# Patient Record
Sex: FEMALE | Race: BLACK OR AFRICAN AMERICAN | Employment: UNEMPLOYED | URBAN - METROPOLITAN AREA
[De-identification: names, ages, dates, MRNs, and addresses within clinical notes are randomized per-mention and may not be internally consistent; named-entity substitution may affect disease eponyms.]

---

## 2024-11-08 ENCOUNTER — OFFICE VISIT (OUTPATIENT)
Age: 49
End: 2024-11-08

## 2024-11-08 VITALS
OXYGEN SATURATION: 96 % | TEMPERATURE: 98.1 F | HEART RATE: 101 BPM | BODY MASS INDEX: 28.28 KG/M2 | SYSTOLIC BLOOD PRESSURE: 112 MMHG | DIASTOLIC BLOOD PRESSURE: 80 MMHG | HEIGHT: 61 IN | WEIGHT: 149.8 LBS

## 2024-11-08 DIAGNOSIS — Z02.2 ENCOUNTER FOR EXAMINATION FOR ADMISSION TO RESIDENTIAL INSTITUTION: Primary | ICD-10-CM

## 2024-11-08 RX ORDER — ESZOPICLONE 2 MG/1
2 TABLET, FILM COATED ORAL NIGHTLY
COMMUNITY

## 2024-11-08 RX ORDER — QUETIAPINE FUMARATE 200 MG/1
200 TABLET, FILM COATED ORAL 2 TIMES DAILY
COMMUNITY

## 2024-11-08 RX ORDER — DIVALPROEX SODIUM 500 MG/1
500 TABLET, DELAYED RELEASE ORAL 3 TIMES DAILY
COMMUNITY

## 2024-11-08 RX ORDER — QUETIAPINE FUMARATE 400 MG/1
400 TABLET, FILM COATED ORAL 2 TIMES DAILY
COMMUNITY

## 2024-11-08 NOTE — PROGRESS NOTES
Mulugeta Knowles (:  1975) is a 49 y.o. female,New patient, here for evaluation of the following chief complaint(s):  Annual Exam         Assessment & Plan  Encounter for examination for admission to residential institution   Clear for admission to facility           No follow-ups on file.       Subjective   Pt here for physical exam for sober-living facility  Pt has hx of alcohol abuse and is trying to quit  Does smoke cigarettes which she states she is trying to quit      History provided by:  Patient      Review of Systems   All other systems reviewed and are negative.         Objective   Physical Exam  Vitals reviewed.   Constitutional:       Appearance: Normal appearance.   HENT:      Head: Normocephalic and atraumatic.   Eyes:      Extraocular Movements: Extraocular movements intact.      Pupils: Pupils are equal, round, and reactive to light.   Cardiovascular:      Rate and Rhythm: Normal rate and regular rhythm.      Heart sounds: Normal heart sounds.   Pulmonary:      Effort: Pulmonary effort is normal.      Breath sounds: Normal breath sounds.   Musculoskeletal:         General: Normal range of motion.      Cervical back: Normal range of motion.   Skin:     General: Skin is warm and dry.   Neurological:      Mental Status: She is alert and oriented to person, place, and time.   Psychiatric:         Attention and Perception: Attention normal.         Speech: Speech normal.         Behavior: Behavior is cooperative.                  An electronic signature was used to authenticate this note.    --Roselia Grace, PIETER - CNP

## 2025-04-29 ENCOUNTER — OFFICE VISIT (OUTPATIENT)
Age: 50
End: 2025-04-29

## 2025-04-29 VITALS
OXYGEN SATURATION: 95 % | WEIGHT: 214 LBS | DIASTOLIC BLOOD PRESSURE: 80 MMHG | HEIGHT: 61 IN | TEMPERATURE: 98.6 F | SYSTOLIC BLOOD PRESSURE: 120 MMHG | BODY MASS INDEX: 40.4 KG/M2 | HEART RATE: 77 BPM

## 2025-04-29 DIAGNOSIS — L23.7 POISON IVY DERMATITIS: Primary | ICD-10-CM

## 2025-04-29 RX ORDER — PREDNISONE 10 MG/1
TABLET ORAL
Qty: 41 TABLET | Refills: 0 | Status: SHIPPED | OUTPATIENT
Start: 2025-04-29 | End: 2025-05-15

## 2025-04-29 RX ORDER — BENZOCAINE/MENTHOL 6 MG-10 MG
LOZENGE MUCOUS MEMBRANE
Qty: 30 G | Refills: 1 | Status: SHIPPED | OUTPATIENT
Start: 2025-04-29 | End: 2025-05-06

## 2025-04-29 ASSESSMENT — ENCOUNTER SYMPTOMS
GASTROINTESTINAL NEGATIVE: 1
COUGH: 0
EYES NEGATIVE: 1
SHORTNESS OF BREATH: 0
WHEEZING: 0

## 2025-04-29 NOTE — PATIENT INSTRUCTIONS
Follow up with your primary care provider as discussed.    Take medication as prescribed.    Go to the nearest emergency room for symptoms including, but not limited to, shortness of breath, chest pain, mental status change, fevers >101, difficulty or inability to swallow, dehydration, or if symptoms worsen.

## 2025-04-29 NOTE — PROGRESS NOTES
myalgias.   Skin:  Positive for rash (small fluid filled pustules, grouped on wrist and thighs).   Neurological: Negative.    Psychiatric/Behavioral: Negative.         Physical Exam  Vitals and nursing note reviewed.   Constitutional:       Appearance: Normal appearance.   HENT:      Right Ear: Tympanic membrane normal.      Left Ear: Tympanic membrane normal.      Nose: Nose normal.      Mouth/Throat:      Mouth: Mucous membranes are moist.      Pharynx: No posterior oropharyngeal erythema.   Eyes:      Pupils: Pupils are equal, round, and reactive to light.   Cardiovascular:      Rate and Rhythm: Normal rate and regular rhythm.   Pulmonary:      Effort: Pulmonary effort is normal.      Breath sounds: Normal breath sounds.   Abdominal:      Palpations: Abdomen is soft.   Musculoskeletal:         General: No swelling or tenderness. Normal range of motion.      Cervical back: Normal range of motion and neck supple.   Skin:     General: Skin is warm.      Findings: Rash (small area on medial wrist/forearm with grouped pruritic pustules, newly forming pustules on lateral sides of thighs.) present.   Neurological:      Mental Status: She is alert and oriented to person, place, and time.           An electronic signature was used to authenticate this note.    --Kelli Kruse, APRN - CNP